# Patient Record
Sex: MALE | URBAN - METROPOLITAN AREA
[De-identification: names, ages, dates, MRNs, and addresses within clinical notes are randomized per-mention and may not be internally consistent; named-entity substitution may affect disease eponyms.]

---

## 2017-09-21 ENCOUNTER — TRANSFERRED RECORDS (OUTPATIENT)
Dept: HEALTH INFORMATION MANAGEMENT | Facility: CLINIC | Age: 62
End: 2017-09-21

## 2017-10-02 ENCOUNTER — PRE VISIT (OUTPATIENT)
Dept: ORTHOPEDICS | Facility: CLINIC | Age: 62
End: 2017-10-02

## 2017-10-02 NOTE — TELEPHONE ENCOUNTER
1.  Date/reason for appt: 10/12/17- L TIBIA, POSSIBLE MASS     2.  Referring provider: Kimberley Augustin NP     3.  Call to patient (Yes / No - short description): No - pt is referred.     4.  Previous care at / records requested from:     1. [facility] - faxed cover sheet to [phone].

## 2017-10-02 NOTE — TELEPHONE ENCOUNTER
Records received from [place]. Forwarded to clinic.     Includes:  Office note: 9/21/17   Other: Labs   Radiology report:    -XR Tib/Fib 9/21/17

## 2017-10-12 ENCOUNTER — OFFICE VISIT (OUTPATIENT)
Dept: ORTHOPEDICS | Facility: CLINIC | Age: 62
End: 2017-10-12

## 2017-10-12 VITALS — WEIGHT: 237 LBS | HEIGHT: 73 IN | BODY MASS INDEX: 31.41 KG/M2

## 2017-10-12 DIAGNOSIS — M89.9 BONE LESION: ICD-10-CM

## 2017-10-12 DIAGNOSIS — R22.42 MASS OF LEFT LOWER EXTREMITY: ICD-10-CM

## 2017-10-12 DIAGNOSIS — R22.42 MASS OF LEFT LOWER EXTREMITY: Primary | ICD-10-CM

## 2017-10-12 DIAGNOSIS — M88.9 PAGET'S DISEASE OF BONE: ICD-10-CM

## 2017-10-12 LAB
ALP SERPL-CCNC: 794 U/L (ref 40–150)
CALCIUM SERPL-MCNC: 10.1 MG/DL (ref 8.5–10.1)

## 2017-10-12 ASSESSMENT — ENCOUNTER SYMPTOMS
SEIZURES: 0
ARTHRALGIAS: 0
MUSCLE WEAKNESS: 0
DISTURBANCES IN COORDINATION: 0
DIZZINESS: 0
PARALYSIS: 0
LOSS OF CONSCIOUSNESS: 0
MUSCLE CRAMPS: 0
MEMORY LOSS: 0
HEADACHES: 0
SPEECH CHANGE: 0
NECK PAIN: 0
NUMBNESS: 0
WEAKNESS: 0
JOINT SWELLING: 0
TINGLING: 0
STIFFNESS: 0
BACK PAIN: 0
TREMORS: 0
MYALGIAS: 0

## 2017-10-12 NOTE — PROGRESS NOTES
"    [facility], Orthopaedic Oncology Surgery Consultation  by Iker Kaufman M.D.    YULIYA Cordero MRN# [id number]   Age: 62 year old YOB: 1955     Requesting physician: Kimberley Augustin, CNP  No primary care provider on file.  Dr. Posadas, Orthopaedic surgeon, [place]            Assessment and Plan:   Assessment:  1. Paget's disease L tibia  2. Diff dx:  Blunt trauma with contusion, however, cannot r/o possibility of Paget's sarcoma.  3. Elevated HbA1c     Plan:  1. XR tibia  2. MRI tibia to r/o paget's sarcoma  3. Check Ca++ and Alk Phos  4. Final recommendations pending above w/u.  Lizzie Davis RN to call him.                 History of Present Illness:   62 year old male  chief complaint    He has been limping on L leg for last few years. Doesn't use a cane.   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  Able to still work despite his sx's.    Current symptoms:  Problem: L leg soreness and pain .  Some improvement over last 3 weeks.  Onset and duration: one month  Awakens from sleep due to sx's:  No  Precipitating Injury:  Yes  . Blunt trauma from a stone bumping leg during work 9/15/17.    Other joints or sites painful:  No  Fever: No  Appetite change or weight loss: No           Physical Exam:     EXAMINATION pertinent findings:   VITAL SIGNS: Height 1.854 m (6' 1\"), weight 107.5 kg (237 lb).  Body mass index is 31.27 kg/(m^2).  RESP: non labored breathing   ABD: benign   SKIN: grossly normal   LYMPHATIC: grossly normal   NEURO: grossly normal   VASCULAR: satisfactory perfusion of all extremities   MUSCULOSKELETAL:   Anterior bowing deformity.  Antalgic gait.  Knee exam nl.  No laxity.  Slight warmth over mid tibia with some soft tissue swelling.  No ecchymosis seen.             Data:   All laboratory data reviewed  All imaging studies reviewed by me       Iker Kaufman MD  [facility] Family Professor  Oncology and Adult Reconstructive Surgery  Dept Orthopaedic Surgery, [facility] " Physicians  [phone] office, [phone] pager  [email or web address]            DATA for DOCUMENTATION:         Past Medical History:   There is no problem list on file for this patient.    Past Medical History:   Diagnosis Date     Bone tumor        Also see scanned health assessment forms.       Past Surgical History:     Past Surgical History:   Procedure Laterality Date     C STOMACH SURGERY PROCEDURE UNLISTED              Social History:     Social History     Social History     Marital status:      Spouse name: N/A     Number of children: N/A     Years of education: N/A     Occupational History     Not on file.     Social History Main Topics     Smoking status: Never Smoker     Smokeless tobacco: Never Used     Alcohol use Yes     Drug use: No     Sexual activity: No     Other Topics Concern     Not on file     Social History Narrative     No narrative on file            Family History:       Family History   Problem Relation Age of Onset     Prostate Cancer Father             Medications:     Current Outpatient Prescriptions   Medication Sig     ASPIRIN PO Take 650 mg by mouth daily     No current facility-administered medications for this visit.               Review of Systems:   A comprehensive 10 point review of systems (constitutional, ENT, cardiac, peripheral vascular, lymphatic, respiratory, GI, , Musculoskeletal, skin, Neurological) was performed and found to be negative except as described in this note.     See intake form completed by patient    Answers for HPI/ROS submitted by the patient on 10/12/2017   General Symptoms: No  Skin Symptoms: No  HENT Symptoms: No  EYE SYMPTOMS: No  HEART SYMPTOMS: No  LUNG SYMPTOMS: No  INTESTINAL SYMPTOMS: No  URINARY SYMPTOMS: No  REPRODUCTIVE SYMPTOMS: No  SKELETAL SYMPTOMS: Yes  BLOOD SYMPTOMS: No  NERVOUS SYSTEM SYMPTOMS: Yes  MENTAL HEALTH SYMPTOMS: No  Back pain: No  Muscle aches: No  Neck pain: No  Swollen joints: No  Joint pain: No  Bone pain:  Yes  Muscle cramps: No  Muscle weakness: No  Joint stiffness: No  Bone fracture: No  Trouble with coordination: No  Dizziness or trouble with balance: No  Fainting or black-out spells: No  Memory loss: No  Headache: No  Seizures: No  Speech problems: No  Tingling: No  Tremor: No  Weakness: No  Difficulty walking: Yes  Paralysis: No  Numbness: No

## 2017-10-12 NOTE — NURSING NOTE
"Chief Complaint   Patient presents with     Consult     Pt states that he was seen by his PCP after he had a Rock fall onto his Leg at work on 9/15/17. He states that she ordered a Knee XR and sent him here. Referring:  QUINTON DALTON       62 year old  1955    Ht 1.854 m (6' 1\")  Wt 107.5 kg (237 lb)  BMI 31.27 kg/m2                [facility]ly [facility] - [address]    No Known Allergies  Current Outpatient Prescriptions   Medication     ASPIRIN PO     No current facility-administered medications for this visit.        Vera Gayle C.M.A.       "

## 2017-10-12 NOTE — LETTER
"10/12/2017       RE: YULIYA Cordero  [address]  [place] [address]     Dear Colleague,    Thank you for referring your patient, YULIYA Cordero, to the [facility] at [facility]. Please see a copy of my visit note below.        [facility], Orthopaedic Oncology Surgery Consultation  by Iker Kaufman M.D.    YULIYA Cordero MRN# [id number]   Age: 62 year old YOB: 1955     Requesting physician: Kimberley Augustin, CNP  No primary care provider on file.  Dr. Posadas, Orthopaedic surgeon, [place]            Assessment and Plan:   Assessment:  1. Paget's disease L tibia  2. Diff dx:  Blunt trauma with contusion, however, cannot r/o possibility of Paget's sarcoma.  3. Elevated HbA1c     Plan:  1. XR tibia  2. MRI tibia to r/o paget's sarcoma  3. Check Ca++ and Alk Phos  4. Final recommendations pending above w/u.  Lizzie Davis RN to call him.                 History of Present Illness:   62 year old male  chief complaint    He has been limping on L leg for last few years. Doesn't use a cane.   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  Able to still work despite his sx's.    Current symptoms:  Problem: L leg soreness and pain .  Some improvement over last 3 weeks.  Onset and duration: one month  Awakens from sleep due to sx's:  No  Precipitating Injury:  Yes  . Blunt trauma from a stone bumping leg during work 9/15/17.    Other joints or sites painful:  No  Fever: No  Appetite change or weight loss: No           Physical Exam:     EXAMINATION pertinent findings:   VITAL SIGNS: Height 1.854 m (6' 1\"), weight 107.5 kg (237 lb).  Body mass index is 31.27 kg/(m^2).  RESP: non labored breathing   ABD: benign   SKIN: grossly normal   LYMPHATIC: grossly normal   NEURO: grossly normal   VASCULAR: satisfactory perfusion of all extremities   MUSCULOSKELETAL:   Anterior bowing deformity.  Antalgic gait.  Knee exam nl.  No laxity.  Slight warmth over mid tibia with " some soft tissue swelling.  No ecchymosis seen.             Data:   All laboratory data reviewed  All imaging studies reviewed by me       Iker Kaufman MD  [facility] Family Professor  Oncology and Adult Reconstructive Surgery  Dept Orthopaedic Surgery, [facility]  [phone] office, [phone] pager  [email or web address]            DATA for DOCUMENTATION:         Past Medical History:   There is no problem list on file for this patient.    Past Medical History:   Diagnosis Date     Bone tumor        Also see scanned health assessment forms.       Past Surgical History:     Past Surgical History:   Procedure Laterality Date     C STOMACH SURGERY PROCEDURE UNLISTED              Social History:     Social History     Social History     Marital status:      Spouse name: N/A     Number of children: N/A     Years of education: N/A     Occupational History     Not on file.     Social History Main Topics     Smoking status: Never Smoker     Smokeless tobacco: Never Used     Alcohol use Yes     Drug use: No     Sexual activity: No     Other Topics Concern     Not on file     Social History Narrative     No narrative on file            Family History:       Family History   Problem Relation Age of Onset     Prostate Cancer Father             Medications:     Current Outpatient Prescriptions   Medication Sig     ASPIRIN PO Take 650 mg by mouth daily     No current facility-administered medications for this visit.               Review of Systems:   A comprehensive 10 point review of systems (constitutional, ENT, cardiac, peripheral vascular, lymphatic, respiratory, GI, , Musculoskeletal, skin, Neurological) was performed and found to be negative except as described in this note.     See intake form completed by patient      Again, thank you for allowing me to participate in the care of your patient.      Sincerely,    Iker Kaufman MD

## 2017-10-12 NOTE — MR AVS SNAPSHOT
After Visit Summary   10/12/2017    YULIYA Cordero    MRN: [id number]           Patient Information     Date Of Birth          1955        Visit Information        Provider Department      10/12/2017 12:30 PM Iker Kaufman MD [facility]        Today's Diagnoses     Mass of left lower extremity    -  1    Bone lesion        Paget's disease of bone           Follow-ups after your visit        Your next 10 appointments already scheduled     Oct 20, 2017  1:45 PM CDT   (Arrive by 1:30 PM)   MR LOWER EXTREMITY NON JOINT LEFT W/O & W CONTRAST with [id number]   [facility] ([facility])    [address] [address] [phone]           Take your medicines as usual, unless your doctor tells you not to. Bring a list of your current medicines to your exam (including vitamins, minerals and over-the-counter drugs).  You will be given intravenous contrast for this exam. To prepare:   The day before your exam, drink extra fluids at least six 8-ounce glasses (unless your doctor tells you to restrict your fluids).   Have a blood test (creatinine test) within 30 days of your exam. Go to your clinic or Diagnostic Imaging Department for this test.  The MRI machine uses a strong magnet. Please wear clothes without metal (snaps, zippers). A sweatsuit works well, or we may give you a hospital gown.  Please remove any body piercings and hair extensions before you arrive. You will also remove watches, jewelry, hairpins, wallets, dentures, partial dental plates and hearing aids. You may wear contact lenses, and you may be able to wear your rings. We have a safe place to keep your personal items, but it is safer to leave them at home.   **IMPORTANT** THE INSTRUCTIONS BELOW ARE ONLY FOR THOSE PATIENTS WHO HAVE BEEN TOLD THEY WILL RECEIVE SEDATION OR GENERAL ANESTHESIA DURING THEIR MRI PROCEDURE:  IF YOU WILL RECEIVE SEDATION (take  medicine to help you relax during your exam):   You must get the medicine from your doctor before you arrive. Bring the medicine to the exam. Do not take it at home.   Arrive one hour early. Bring someone who can take you home after the test. Your medicine will make you sleepy. After the exam, you may not drive, take a bus or take a taxi by yourself.   No eating 8 hours before your exam. You may have clear liquids up until 4 hours before your exam. (Clear liquids include water, clear tea, black coffee and fruit juice without pulp.)  IF YOU WILL RECEIVE ANESTHESIA (be asleep for your exam):   Arrive 1 1/2 hours early. Bring someone who can take you home after the test. You may not drive, take a bus or take a taxi by yourself.   No eating 8 hours before your exam. You may have clear liquids up until 4 hours before your exam. (Clear liquids include water, clear tea, black coffee and fruit juice without pulp.)  Please call the Imaging Department at your exam site with any questions.              Future tests that were ordered for you today     Open Future Orders        Priority Expected Expires Ordered    Calcium Routine  11/11/2017 10/12/2017    Alkaline phosphatase Routine  11/11/2017 10/12/2017    MRI Lower extremity non joint w w/o contrast LT Routine  10/12/2018 10/12/2017            Who to contact     Please call your clinic at [phone] to:    Ask questions about your health    Make or cancel appointments    Discuss your medicines    Learn about your test results    Speak to your doctor   If you have compliments or concerns about an experience at your clinic, or if you wish to file a complaint, please contact [facility] Patient Relations at [phone] or email us at [email or web address]         Additional Information About Your Visit        DropShip[employer]REH Information     What They Like is an electronic gateway that provides easy, online access to your medical records. With What They Like,  "you can request a clinic appointment, read your test results, renew a prescription or communicate with your care team.     To sign up for [employer] visit the website at [email or web address]   You will be asked to enter the access code listed below, as well as some personal information. Please follow the directions to create your username and password.     Your access code is: [email or web address]  Expires: 2017  6:30 AM     Your access code will  in 90 days. If you need help or a new code, please contact your [facility] or call [phone] for assistance.        Care EveryWhere ID     This is your Care EveryWhere ID. This could be used by other organizations to access your [place] medical records  [id number]        Your Vitals Were     Height BMI (Body Mass Index)                1.854 m (6' 1\") 31.27 kg/m2           Blood Pressure from Last 3 Encounters:   No data found for BP    Weight from Last 3 Encounters:   10/12/17 107.5 kg (237 lb)               Primary Care Provider    None Specified       No primary provider on file.        Equal Access to Services     [facility]: Hadii melanie luna Sosalud, waaxda luqadaha, qaybta kaalmaabdias hart, thompson hagan . So [facility] [phone].    ATENCIÓN: Si habla español, tiene a pantoja disposición servicios gratuitos de asistencia lingüística. Llame al [phone].    We comply with applicable federal civil rights laws and [place] laws. We do not discriminate on the basis of race, color, national origin, age, disability, sex, sexual orientation, or gender identity.            Thank you!     Thank you for choosing [facility] ORTHOPA[facility]IC [facility]  for your care. Our goal is always to provide you with excellent care. Hearing back from our patients is one way we can continue to improve our services. Please take a few minutes to complete the written survey that you may receive in the mail after your visit with " us. Thank you!             Your Updated Medication List - Protect others around you: Learn how to safely use, store and throw away your medicines at www.disposemymeds.org.          This list is accurate as of: 10/12/17  2:01 PM.  Always use your most recent med list.                   Brand Name Dispense Instructions for use Diagnosis    ASPIRIN PO      Take 650 mg by mouth daily